# Patient Record
Sex: FEMALE | Race: WHITE
[De-identification: names, ages, dates, MRNs, and addresses within clinical notes are randomized per-mention and may not be internally consistent; named-entity substitution may affect disease eponyms.]

---

## 2019-11-01 ENCOUNTER — HOSPITAL ENCOUNTER (EMERGENCY)
Dept: HOSPITAL 72 - EMR | Age: 44
Discharge: HOME | End: 2019-11-01
Payer: COMMERCIAL

## 2019-11-01 VITALS — BODY MASS INDEX: 28.35 KG/M2 | WEIGHT: 160 LBS | HEIGHT: 63 IN

## 2019-11-01 VITALS — DIASTOLIC BLOOD PRESSURE: 78 MMHG | SYSTOLIC BLOOD PRESSURE: 131 MMHG

## 2019-11-01 DIAGNOSIS — R21: Primary | ICD-10-CM
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
## 2019-11-01 DIAGNOSIS — S40.811A: ICD-10-CM

## 2019-11-01 DIAGNOSIS — S80.812A: ICD-10-CM
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
## 2019-11-01 DIAGNOSIS — S40.812A: ICD-10-CM
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
## 2019-11-01 DIAGNOSIS — S80.811A: ICD-10-CM

## 2019-11-01 PROCEDURE — 99282 EMERGENCY DEPT VISIT SF MDM: CPT

## 2019-11-01 NOTE — NUR
ER DISCHARGE NOTE:

Patient is cleared to be discharged per ERMD with friend, pt is aox4, on room 
air, with stable vital signs. pt was given dc and prescription instructions, pt 
was able to verbalize understanding, pt id band removed without complications. 
pt is able to ambulate with steady gait. pt took all belongings.

## 2019-11-01 NOTE — NUR
ED Nurse Note:

PT. AAOX4. AMBULATORY. PT. WALKED IN TO ER FO HOME. PER PT. SHE FELL DURING 
THE HIKE AND C/O L-KNEE PAIN WITH ABRASION NOTED. NO ACTIVE BLEEDING NOTED AT 
THIS TIME. PT. ALSO STATED THAT SHE HAS INSECT BITES ALL OVER HER BODY

## 2019-11-01 NOTE — EMERGENCY ROOM REPORT
History of Present Illness


General


Chief Complaint:  Lower Extremity Injury


Source:  Patient





Present Illness


HPI


Patient presents with partner with complaints of fall last night patient 

reports that they have been hiking


And


They both sustained a fall


Patient also has been having increased itching to both of her arms and legs


Denies any focal weakness denies any fevers


Patient had abrasions to both upper and lower extremities denies any neck pain





Denies any neuropathy


Allergies:  


Coded Allergies:  


     No Known Allergies (Unverified , 11/1/19)





Patient History


Past Medical History:  see triage record


Reviewed Nursing Documentation:  PMH: Agreed; PSxH: Agreed





Review of Systems


All Other Systems:  negative except mentioned in HPI





Physical Exam





Vital Signs








  Date Time  Temp Pulse Resp B/P (MAP) Pulse Ox O2 Delivery O2 Flow Rate FiO2


 


11/1/19 11:02 98.2 92 19 143/75 (97) 99 Room Air  








Sp02 EP Interpretation:  reviewed, normal


General Appearance:  well appearing, no apparent distress


Head:  normocephalic, atraumatic


Eyes:  bilateral eye PERRL, bilateral eye EOMI


ENT:  normal pharynx


Neck:  supple


Respiratory:  lungs clear, no respiratory distress, no retraction


Cardiovascular #1:  regular rate, rhythm


Gastrointestinal:  non tender, soft


Musculoskeletal:  normal inspection


Neurologic:  alert, oriented x3


Skin:  other - Several areas of abrasions involving both upper and lower 

extremities no obvious lacerations, patient also appears to have a possible 

contact dermatitis on both forearms mild erythema


Lymphatic:  no adenopathy





Medical Decision Making


Diagnostic Impression:  


 Primary Impression:  


 Injury of lower extremity


 Additional Impressions:  


 abrasions


 rash


ER Course


Given the history exam and findings I do not have any findings that are 

consistent with possible fracture therefore imaging is not obtained patient has 

wound care applied also given the contact dermatitis prescription is written


Patient is stable for close follow-up





Last Vital Signs








  Date Time  Temp Pulse Resp B/P (MAP) Pulse Ox O2 Delivery O2 Flow Rate FiO2


 


11/1/19 11:02 98.2 92 19 143/75 (97) 99 Room Air  








Status:  improved


Disposition:  HOME, SELF-CARE


Condition:  Improved


Scripts


Calamine/Zinc Oxide (CALAMINE LOTION*) 177 Ml Suspension


1 APPLIC TP BID for 5 Days, ML 0 Refills


   Prov: Declan Florian DO         11/1/19


Referrals:  


[facility]











[facility]











[facility]


Patient Instructions:  Abrasion, Easy-to-Read, Rash, Easy-to-Read





Additional Instructions:  


Patient is provided with the discharge instructions notified to follow up with 

primary doctor in the next 2-3 days otherwise return to the er with any 

worsening symptoms.


Please note that this report is being documented using DRAGON technology.  This 

can lead to erroneous entry secondary to incorrect interpretation by the 

dictating instrument.











Declan Florian DO Nov 1, 2019 12:10